# Patient Record
Sex: MALE | URBAN - METROPOLITAN AREA
[De-identification: names, ages, dates, MRNs, and addresses within clinical notes are randomized per-mention and may not be internally consistent; named-entity substitution may affect disease eponyms.]

---

## 2017-01-03 ENCOUNTER — APPOINTMENT (RX ONLY)
Dept: URBAN - METROPOLITAN AREA OTHER 12 | Facility: OTHER | Age: 32
Setting detail: DERMATOLOGY
End: 2017-01-03

## 2017-01-03 DIAGNOSIS — L73.1 PSEUDOFOLLICULITIS BARBAE: ICD-10-CM

## 2017-01-03 PROCEDURE — ? TREATMENT REGIMEN

## 2017-01-03 PROCEDURE — ? PRESCRIPTION

## 2017-01-03 PROCEDURE — 99213 OFFICE O/P EST LOW 20 MIN: CPT

## 2017-01-03 RX ORDER — CLINDAMYCIN PHOSPHATE 10 MG/ML
LOTION TOPICAL QD
Qty: 1 | Refills: 3 | Status: ERX | COMMUNITY
Start: 2017-01-03

## 2017-01-03 RX ADMIN — CLINDAMYCIN PHOSPHATE: 10 LOTION TOPICAL at 18:22

## 2017-01-03 NOTE — PROCEDURE: TREATMENT REGIMEN
Plan: Discussed [facility] again. Patient is instructed to schedule with KK if he would like to pursue treatment. \\n\\nHe is instructed to continue avoiding picking at new lesions and to RTC for ILK if he would like
Initiate Treatment: Antibacterial/BPO wash in the shower\\nClindamycin lotion QAM to affected areas on the face
Detail Level: Simple
Continue Regimen: Mupirocin PRN
Samples Given: Paga coupon for use at [employer] attached to Rx and text to patient

## 2021-09-05 ENCOUNTER — TRANSFERRED RECORDS (OUTPATIENT)
Dept: HEALTH INFORMATION MANAGEMENT | Facility: CLINIC | Age: 36
End: 2021-09-05

## 2021-09-10 ENCOUNTER — HOSPITAL ENCOUNTER (EMERGENCY)
Facility: CLINIC | Age: 36
Discharge: HOME | End: 2021-09-10
Attending: EMERGENCY MEDICINE | Admitting: EMERGENCY MEDICINE
Payer: COMMERCIAL

## 2021-09-10 ENCOUNTER — MEDICAL CORRESPONDENCE (OUTPATIENT)
Dept: HEALTH INFORMATION MANAGEMENT | Facility: CLINIC | Age: 36
End: 2021-09-10

## 2021-09-10 VITALS
OXYGEN SATURATION: 100 % | HEIGHT: 75 IN | DIASTOLIC BLOOD PRESSURE: 64 MMHG | WEIGHT: 200 LBS | RESPIRATION RATE: 16 BRPM | BODY MASS INDEX: 24.87 KG/M2 | HEART RATE: 75 BPM | TEMPERATURE: 97 F | SYSTOLIC BLOOD PRESSURE: 118 MMHG

## 2021-09-10 DIAGNOSIS — G56.32 RADIAL NEUROPATHY, LEFT: ICD-10-CM

## 2021-09-10 PROCEDURE — 99282 EMERGENCY DEPT VISIT SF MDM: CPT | Performed by: EMERGENCY MEDICINE

## 2021-09-10 ASSESSMENT — MIFFLIN-ST. JEOR: SCORE: 1912.82

## 2021-09-10 NOTE — ED TRIAGE NOTES
"numbness and limited use of left hand. states about 10 days ago he \"passed out\" for about 5 hours laying on the left arm and is concerned that these symptoms persist  "

## 2021-09-10 NOTE — ED NOTES
"Patient presents to ED from [facility].  Left arm tingling and decreased range of movement for approximately 10 days.  Patient laid on left arm 10 days ago for approximately 5 hours and has had tingling and decreased movement since.  Patient stated that it has improved, but \"not a lot\".   "

## 2021-09-12 NOTE — ED PROVIDER NOTES
"  History     Chief Complaint   Patient presents with     Numbness     numbness and limited use of left hand. states about 10 days ago he \"passed out\" for about 5 hours laying on the left arm and is concerned that these symptoms persist     HPI  Armando Rios is a 36 year old male who presents with left arm weakness at the wrist, unable to extend wrist.  Ongoing for the past 10 days, passed out and slept on his left arm for 5 hours 10 days ago.  Describes some numbness in the radial nerve distribution.  Denies history of compressive peripheral neuropathy in the past.    Allergies:  No Known Allergies    Problem List:    There are no problems to display for this patient.       Past Medical History:    No past medical history on file.    Past Surgical History:    No past surgical history on file.    Family History:    No family history on file.    Social History:  Marital Status:   [2]  Social History     Tobacco Use     Smoking status: Not on file   Substance Use Topics     Alcohol use: Not on file     Drug use: Not on file        Medications:    No current outpatient medications on file.        Review of Systems  Problem focused review of systems otherwise negative    Physical Exam   BP: 118/64  Pulse: 75  Temp: 97  F (36.1  C)  Resp: 16  Height: 190.5 cm (6' 3\")  Weight: 90.7 kg (200 lb)  SpO2: 100 %      Physical Exam  Nontoxic-appearing no respiratory distress alert and oriented  Skin pink warm dry  Left upper extremity shows weakness, 3/5 at the wrist extensors and digit extensors and distribution of radial nerve, there is associated numbness in the radial nerve distribution.  Flexion strength is intact.  Capillary refills normal radial pulse strong  ED Course        Procedures              Critical Care time:  none                    No results found for this or any previous visit (from the past 24 hour(s)).    Medications - No data to display    Assessments & Plan (with Medical Decision " Making)  36-year-old male left upper extremity extensor weakness at the wrist and digits, numbness over the radial nerve distribution consistent with radial nerve palsy secondary to compression.  Physical therapy neurology and follow-up.     I have reviewed the nursing notes.    I have reviewed the findings, diagnosis, plan and need for follow up with the patient.       There are no discharge medications for this patient.      Final diagnoses:   Radial neuropathy, left - compressive       9/10/2021   [facility] EMERGENCY DEPT     Michael Way MD  09/11/21 3122